# Patient Record
Sex: FEMALE | Race: WHITE
[De-identification: names, ages, dates, MRNs, and addresses within clinical notes are randomized per-mention and may not be internally consistent; named-entity substitution may affect disease eponyms.]

---

## 2019-02-21 ENCOUNTER — HOSPITAL ENCOUNTER (OUTPATIENT)
Dept: HOSPITAL 92 - RAD | Age: 19
Discharge: HOME | End: 2019-02-21
Attending: PEDIATRICS
Payer: COMMERCIAL

## 2019-02-21 DIAGNOSIS — S92.524A: ICD-10-CM

## 2019-02-21 DIAGNOSIS — M79.671: Primary | ICD-10-CM

## 2019-02-21 NOTE — RAD
RIGHT FOOT THREE VIEWS: 

2/21/19

 

HISTORY: 

Pain. Bone abnormality.

 

COMPARISON:  

None.

 

FINDINGS:  

Lisfranc alignment is maintained. Joint spaces are preserved. Nondisplaced fracture involving the mid
dle phalanx of the fourth digit. 

 

IMPRESSION:  

Nondisplaced fracture involving the middle phalanx  of the fourth digit. 

 

Code T 

 

POS: MALCOLM